# Patient Record
(demographics unavailable — no encounter records)

---

## 2025-03-20 NOTE — ASSESSMENT
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 UA cleared from [facility] Normal creatinine, calcium 10.3,   bone density 8/2018- WNL  Repeat parameters today  Dexa- normal, no bone loss   2. B12 deficiency - 334, continue supplementation  3. Anemia- Hg 11 ferritin - pending, no blood loss, chronic, - chronic anemia   4. Hypomagnesemia - Mg 1.4 - PO Mag replacement, double  5.Pain in hands and shoulders, swelling of the hands, morning stiffness x 2 weeks, labs for rheum ordered  6. SLE- diagnosed 2023 Blood drawn in office. Ordered and reviewed. labs next visit - B12, irons, MM panel   Patient on AC after the stent placement ( cardiac evaluation for dyspnea)  follow up 1 year [AdvancecareDate] : 03/19/24

## 2025-03-20 NOTE — BEGINNING OF VISIT
[0] : 2) Feeling down, depressed, or hopeless: Not at all (0) [PHQ-2 Negative] : PHQ-2 Negative [Pain Scale: ___] : On a scale of 1-10, today the patient's pain is a(n) [unfilled]. [Former] : Former [> 15 Years] : > 15 Years [Date Discussed (MM/DD/YY): ___] : Discussed: [unfilled] [Reviewed, no changes] : Reviewed, no changes [[id number]] : 0 [de-identified] : quit 1974

## 2025-03-20 NOTE — CONSULT LETTER
[Dear  ___] : Dear  [unfilled], [Courtesy Letter:] : I had the pleasure of seeing your patient, [unfilled], in my office today. [Please see my note below.] : Please see my note below. [Consult Closing:] : Thank you very much for allowing me to participate in the care of this patient.  If you have any questions, please do not hesitate to contact me. [Sincerely,] : Sincerely, [FreeTextEntry3] : Ann Cano MD\par  [facility] [place] at [facility]\par

## 2025-03-20 NOTE — BEGINNING OF VISIT
[0] : 2) Feeling down, depressed, or hopeless: Not at all (0) [PHQ-2 Negative] : PHQ-2 Negative [Pain Scale: ___] : On a scale of 1-10, today the patient's pain is a(n) [unfilled]. [Former] : Former [> 15 Years] : > 15 Years [Date Discussed (MM/DD/YY): ___] : Discussed: [unfilled] [Reviewed, no changes] : Reviewed, no changes [[id number]] : 0 [de-identified] : quit 1974

## 2025-03-20 NOTE — ASSESSMENT
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 UA cleared from [facility] Normal creatinine, calcium 10.3,   bone density 8/2018- WNL  Repeat parameters today  Dexa- normal, no bone loss   2. B12 deficiency - 334, continue supplementation  3. Anemia- Hg 11 ferritin - pending, no blood loss, chronic, - chronic anemia   4. Hypomagnesemia - Mg 1.4 - PO Mag replacement, double  5.Pain in hands and shoulders, swelling of the hands, morning stiffness x 2 weeks, labs for rheum ordered  6. SLE- diagnosed 2023 Blood drawn in office. Ordered and reviewed. labs next visit - B12, irons, MM panel   Patient on AC after the stent placement ( cardiac evaluation for dyspnea)  follow up 1 year [AdvancecareDate] : 03/19/24

## 2025-03-20 NOTE — HISTORY OF PRESENT ILLNESS
[Disease:__________________________] : Disease: [unfilled] [de-identified] : Pt. is a 72 year old f/u for multiple myeloma, anemia and B12 def. 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 Patient has no physical complaints at this time.  States overall feels well. Continues on B12 injections every other week.   MAMMO: over due, has to schedule DEXA: 12/2024 [facility]: last one was about 2-3 years ago; no longer needs to follow

## 2025-03-20 NOTE — BEGINNING OF VISIT
[0] : 2) Feeling down, depressed, or hopeless: Not at all (0) [PHQ-2 Negative] : PHQ-2 Negative [Pain Scale: ___] : On a scale of 1-10, today the patient's pain is a(n) [unfilled]. [Former] : Former [> 15 Years] : > 15 Years [Date Discussed (MM/DD/YY): ___] : Discussed: [unfilled] [Reviewed, no changes] : Reviewed, no changes [[id number]] : 0 [de-identified] : quit 1974

## 2025-03-20 NOTE — CONSULT LETTER
[Dear  ___] : Dear  [unfilled], [Courtesy Letter:] : I had the pleasure of seeing your patient, [unfilled], in my office today. [Please see my note below.] : Please see my note below. [Consult Closing:] : Thank you very much for allowing me to participate in the care of this patient.  If you have any questions, please do not hesitate to contact me. [Sincerely,] : Sincerely, [FreeTextEntry3] : Ann Cano MD\par  [facility] [place] at [facility]\par

## 2025-03-20 NOTE — PHYSICAL EXAM
[Fully active, able to carry on all pre-disease performance without restriction] : Status 0 - Fully active, able to carry on all pre-disease performance without restriction [Normal] : affect appropriate [de-identified] : left knee replacement

## 2025-03-20 NOTE — PHYSICAL EXAM
[Fully active, able to carry on all pre-disease performance without restriction] : Status 0 - Fully active, able to carry on all pre-disease performance without restriction [Normal] : affect appropriate [de-identified] : left knee replacement

## 2025-03-20 NOTE — PHYSICAL EXAM
[Fully active, able to carry on all pre-disease performance without restriction] : Status 0 - Fully active, able to carry on all pre-disease performance without restriction [Normal] : affect appropriate [de-identified] : left knee replacement

## 2025-03-20 NOTE — REVIEW OF SYSTEMS
[Diarrhea: Grade 0] : Diarrhea: Grade 0 [Negative] : Allergic/Immunologic [Patient Intake Form Reviewed] : Patient intake form was reviewed [Chills] : no chills [Night Sweats] : no night sweats [Fatigue] : no fatigue

## 2025-03-20 NOTE — REASON FOR VISIT
[Follow-Up Visit] : a follow-up visit for [Monoclonal Gammopathy] : monoclonal gammopathy [Friend] : friend [FreeTextEntry2] : Multiple Myeloma, anemia, B12 def

## 2025-03-20 NOTE — PHYSICAL EXAM
[Fully active, able to carry on all pre-disease performance without restriction] : Status 0 - Fully active, able to carry on all pre-disease performance without restriction [Normal] : affect appropriate [de-identified] : left knee replacement

## 2025-03-20 NOTE — PHYSICAL EXAM
[Fully active, able to carry on all pre-disease performance without restriction] : Status 0 - Fully active, able to carry on all pre-disease performance without restriction [Normal] : affect appropriate [de-identified] : left knee replacement

## 2025-03-20 NOTE — HISTORY OF PRESENT ILLNESS
[Disease:__________________________] : Disease: [unfilled] [de-identified] : Pt. is a 72 year old f/u for multiple myeloma, anemia and B12 def. 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 Patient has no physical complaints at this time.  States overall feels well. Continues on B12 injections every other week.   MAMMO: over due, has to schedule DEXA: 12/2024 [facility]: last one was about 2-3 years ago; no longer needs to follow

## 2025-03-20 NOTE — CONSULT LETTER
[Dear  ___] : Dear  [unfilled], [Courtesy Letter:] : I had the pleasure of seeing your patient, [unfilled], in my office today. [Please see my note below.] : Please see my note below. [Consult Closing:] : Thank you very much for allowing me to participate in the care of this patient.  If you have any questions, please do not hesitate to contact me. [Sincerely,] : Sincerely, [FreeTextEntry3] : Ann Cano MD\par  [facility] [place] at [facility]\par

## 2025-03-20 NOTE — BEGINNING OF VISIT
[0] : 2) Feeling down, depressed, or hopeless: Not at all (0) [PHQ-2 Negative] : PHQ-2 Negative [Pain Scale: ___] : On a scale of 1-10, today the patient's pain is a(n) [unfilled]. [Former] : Former [> 15 Years] : > 15 Years [Date Discussed (MM/DD/YY): ___] : Discussed: [unfilled] [Reviewed, no changes] : Reviewed, no changes [[id number]] : 0 [de-identified] : quit 1974

## 2025-03-20 NOTE — ASSESSMENT
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 UA cleared from [facility] Normal creatinine, calcium 10.3,   bone density 8/2018- WNL  Repeat parameters today  Dexa- normal, no bone loss   2. B12 deficiency - 334, continue supplementation  3. Anemia- Hg 11 ferritin - pending, no blood loss, chronic, - chronic anemia   4. Hypomagnesemia - Mg 1.4 - PO Mag replacement, double  5.Pain in hands and shoulders, swelling of the hands, morning stiffness x 2 weeks, labs for rheum ordered  6. SLE- diagnosed 2023 Blood drawn in office. Ordered and reviewed. labs next visit - B12, irons, MM panel   Patient on AC after the stent placement ( cardiac evaluation for dyspnea)  follow up 1 year [AdvancecareDate] : 03/19/24

## 2025-03-20 NOTE — HISTORY OF PRESENT ILLNESS
[Disease:__________________________] : Disease: [unfilled] [de-identified] : Pt. is a 72 year old f/u for multiple myeloma, anemia and B12 def. 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 Patient has no physical complaints at this time.  States overall feels well. Continues on B12 injections every other week.   MAMMO: over due, has to schedule DEXA: 12/2024 [facility]: last one was about 2-3 years ago; no longer needs to follow

## 2025-03-20 NOTE — REVIEW OF SYSTEMS
Tacrolimus level 6.5.    Please instruct patient to  decrease tacrolimus dose to 1 mg in the morning and 1 mg in the evening.      [Diarrhea: Grade 0] : Diarrhea: Grade 0 [Negative] : Allergic/Immunologic [Patient Intake Form Reviewed] : Patient intake form was reviewed [Chills] : no chills [Night Sweats] : no night sweats [Fatigue] : no fatigue

## 2025-03-20 NOTE — ASSESSMENT
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 UA cleared from [facility] Normal creatinine, calcium 10.3,   bone density 8/2018- WNL  Repeat parameters today  Dexa- normal, no bone loss   2. B12 deficiency - 334, continue supplementation  3. Anemia- Hg 11 ferritin - pending, no blood loss, chronic, - chronic anemia   4. Hypomagnesemia - Mg 1.4 - PO Mag replacement, double  5.Pain in hands and shoulders, swelling of the hands, morning stiffness x 2 weeks, labs for rheum ordered  6. SLE- diagnosed 2023 Blood drawn in office. Ordered and reviewed. labs next visit - B12, irons, MM panel   Patient on AC after the stent placement ( cardiac evaluation for dyspnea)  follow up 1 year [AdvancecareDate] : 03/19/24

## 2025-03-20 NOTE — HISTORY OF PRESENT ILLNESS
[Disease:__________________________] : Disease: [unfilled] [de-identified] : Pt. is a 72 year old f/u for multiple myeloma, anemia and B12 def. 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 Patient has no physical complaints at this time.  States overall feels well. Continues on B12 injections every other week.   MAMMO: over due, has to schedule DEXA: 12/2024 [facility]: last one was about 2-3 years ago; no longer needs to follow

## 2025-03-20 NOTE — HISTORY OF PRESENT ILLNESS
[Disease:__________________________] : Disease: [unfilled] [de-identified] : Pt. is a 72 year old f/u for multiple myeloma, anemia and B12 def. 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 Patient has no physical complaints at this time.  States overall feels well. Continues on B12 injections every other week.   MAMMO: over due, has to schedule DEXA: 12/2024 [facility]: last one was about 2-3 years ago; no longer needs to follow

## 2025-03-20 NOTE — CONSULT LETTER
[Dear  ___] : Dear  [unfilled], [Courtesy Letter:] : I had the pleasure of seeing your patient, [unfilled], in my office today. [Please see my note below.] : Please see my note below. [Consult Closing:] : Thank you very much for allowing me to participate in the care of this patient.  If you have any questions, please do not hesitate to contact me. [Sincerely,] : Sincerely, [FreeTextEntry3] : Ann Cano MD\par  [facility] [place] at [facility]\par

## 2025-03-20 NOTE — ASSESSMENT
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 UA cleared from [facility] Normal creatinine, calcium 10.3,   bone density 8/2018- WNL  Repeat parameters today  Dexa- normal, no bone loss   2. B12 deficiency - 334, continue supplementation  3. Anemia- Hg 11 ferritin - pending, no blood loss, chronic, - chronic anemia   4. Hypomagnesemia - Mg 1.4 - PO Mag replacement, double  5.Pain in hands and shoulders, swelling of the hands, morning stiffness x 2 weeks, labs for rheum ordered  6. SLE- diagnosed 2023 Blood drawn in office. Ordered and reviewed. labs next visit - B12, irons, MM panel   Patient on AC after the stent placement ( cardiac evaluation for dyspnea)  follow up 1 year [AdvancecareDate] : 03/19/24

## 2025-03-20 NOTE — CONSULT LETTER
[Dear  ___] : Dear  [unfilled], [Courtesy Letter:] : I had the pleasure of seeing your patient, [unfilled], in my office today. [Please see my note below.] : Please see my note below. [Consult Closing:] : Thank you very much for allowing me to participate in the care of this patient.  If you have any questions, please do not hesitate to contact me. [Sincerely,] : Sincerely, [FreeTextEntry3] : Ann Cano MD\par  [facility] [place] at [facility]\par